# Patient Record
(demographics unavailable — no encounter records)

---

## 2025-03-03 NOTE — HISTORY OF PRESENT ILLNESS
[de-identified] : Johnny Arellano is a 74-year-old M here for evaluation of leukocytosis and thrombocytosis.   Review of labs from 12/20/24 show WBC = 13.4 K/uL, Neutrophils# 10.1, Monocytes# 1.0, PLT = 522 K/uL.  He endorses PMH of elevated PSA, following with [facility] physicians for monitoring, including MRI last week (last in 2021), and has completed 2 prior unremarkable biopsies. On Silodosin and Finasteride for treatment. Reports that he feels constantly cold. Denies edema, rashes, or unintentional weight loss (has lost ~50 pounds on Ozempic, now stabilizing).  Has never completed colonoscopy.

## 2025-03-03 NOTE — REVIEW OF SYSTEMS
[Fatigue] : no fatigue [Recent Change In Weight] : ~T no recent weight change [Dysphagia] : no dysphagia [Chest Pain] : no chest pain [Abdominal Pain] : no abdominal pain [Joint Pain] : no joint pain [Joint Stiffness] : no joint stiffness [Skin Rash] : no skin rash [Easy Bleeding] : no tendency for easy bleeding [Easy Bruising] : no tendency for easy bruising

## 2025-03-03 NOTE — REASON FOR VISIT
[Initial Consultation] : an initial consultation for [Blood Count Assessment] : blood count assessment [Leukocytosis] : leukocytosis [Thrombocytosis] : thrombocytosis [Spouse] : spouse

## 2025-03-03 NOTE — RESULTS/DATA
[FreeTextEntry1] : Johnny Mendezzzo is a 74-year-old M w/ PMH of elevated PSA, here for evaluation of leukocytosis and thrombocytosis.

## 2025-03-03 NOTE — ADDENDUM
[FreeTextEntry1] : All medical record entries made by the Scribe were at my, Dr. Jenaro Cohen, direction and personally dictated by me on 03/03/2025. I have reviewed the chart and agree that the record accurately reflects my personal performance of the history, physical exam, assessment and plan. I have also personally directed, reviewed, and agreed with the chart.   I, Rosendo Valladares, documented this note as a scribe on behalf of Dr. Jenaro Cohen on 03/03/2025.

## 2025-03-03 NOTE — CONSULT LETTER
[Dear  ___] : Dear  [unfilled], [Consult Letter:] : I had the pleasure of evaluating your patient, [unfilled]. [Please see my note below.] : Please see my note below. [Consult Closing:] : Thank you very much for allowing me to participate in the care of this patient.  If you have any questions, please do not hesitate to contact me. [Sincerely,] : Sincerely, [FreeTextEntry2] : Dr. Carey Guerrero [FreeTextEntry3] : Jenaro Cohen MD